# Patient Record
Sex: FEMALE | Race: BLACK OR AFRICAN AMERICAN | NOT HISPANIC OR LATINO | Employment: OTHER | URBAN - METROPOLITAN AREA
[De-identification: names, ages, dates, MRNs, and addresses within clinical notes are randomized per-mention and may not be internally consistent; named-entity substitution may affect disease eponyms.]

---

## 2024-02-01 RX ORDER — IXEKIZUMAB 80 MG/ML
INJECTION, SOLUTION SUBCUTANEOUS
Qty: 1 ML | OUTPATIENT
Start: 2024-02-01

## 2024-02-02 ENCOUNTER — TELEPHONE (OUTPATIENT)
Dept: DERMATOLOGY | Facility: CLINIC | Age: 64
End: 2024-02-02
Payer: COMMERCIAL

## 2024-02-02 NOTE — TELEPHONE ENCOUNTER
Patient left VM stating [facility] denied her refill request for Taltz. Can you please reach out to patient to provide her with more information about this?    Thanks!  Shae Rivera RN

## 2024-02-08 DIAGNOSIS — L40.9 PSORIASIS: Primary | ICD-10-CM

## 2024-02-13 ENCOUNTER — SPECIALTY PHARMACY (OUTPATIENT)
Dept: PHARMACY | Facility: CLINIC | Age: 64
End: 2024-02-13

## 2024-02-13 PROCEDURE — RXMED WILLOW AMBULATORY MEDICATION CHARGE

## 2024-02-13 RX ORDER — IXEKIZUMAB 80 MG/ML
INJECTION, SOLUTION SUBCUTANEOUS
Qty: 1 ML | Refills: 6 | Status: SHIPPED | OUTPATIENT
Start: 2024-02-13 | End: 2024-05-08 | Stop reason: ALTCHOICE

## 2024-02-14 ENCOUNTER — PHARMACY VISIT (OUTPATIENT)
Dept: PHARMACY | Facility: CLINIC | Age: 64
End: 2024-02-14
Payer: COMMERCIAL

## 2024-02-15 ENCOUNTER — SPECIALTY PHARMACY (OUTPATIENT)
Dept: PHARMACY | Facility: CLINIC | Age: 64
End: 2024-02-15

## 2024-03-07 ENCOUNTER — SPECIALTY PHARMACY (OUTPATIENT)
Dept: PHARMACY | Facility: CLINIC | Age: 64
End: 2024-03-07

## 2024-03-07 PROCEDURE — RXMED WILLOW AMBULATORY MEDICATION CHARGE

## 2024-03-08 ENCOUNTER — PHARMACY VISIT (OUTPATIENT)
Dept: PHARMACY | Facility: CLINIC | Age: 64
End: 2024-03-08
Payer: COMMERCIAL

## 2024-04-02 ENCOUNTER — SPECIALTY PHARMACY (OUTPATIENT)
Dept: PHARMACY | Facility: CLINIC | Age: 64
End: 2024-04-02

## 2024-04-02 PROCEDURE — RXMED WILLOW AMBULATORY MEDICATION CHARGE

## 2024-04-09 ENCOUNTER — PHARMACY VISIT (OUTPATIENT)
Dept: PHARMACY | Facility: CLINIC | Age: 64
End: 2024-04-09
Payer: COMMERCIAL

## 2024-04-18 ENCOUNTER — OFFICE VISIT (OUTPATIENT)
Dept: DERMATOLOGY | Facility: CLINIC | Age: 64
End: 2024-04-18
Payer: COMMERCIAL

## 2024-04-18 DIAGNOSIS — L40.9 PSORIASIS: ICD-10-CM

## 2024-04-18 DIAGNOSIS — L40.0 PSORIASIS VULGARIS: Primary | ICD-10-CM

## 2024-04-18 PROCEDURE — 99214 OFFICE O/P EST MOD 30 MIN: CPT | Performed by: NURSE PRACTITIONER

## 2024-04-18 RX ORDER — CLOBETASOL PROPIONATE 0.5 MG/G
CREAM TOPICAL 2 TIMES DAILY
Qty: 60 G | Refills: 3 | Status: SHIPPED | OUTPATIENT
Start: 2024-04-18 | End: 2024-06-25

## 2024-04-19 PROCEDURE — RXMED WILLOW AMBULATORY MEDICATION CHARGE

## 2024-04-20 RX ORDER — RISANKIZUMAB-RZAA 150 MG/ML
150 INJECTION SUBCUTANEOUS SEE ADMIN INSTRUCTIONS
Qty: 2 ML | Refills: 0 | Status: SHIPPED | OUTPATIENT
Start: 2024-04-20

## 2024-04-20 RX ORDER — RISANKIZUMAB-RZAA 150 MG/ML
150 INJECTION SUBCUTANEOUS SEE ADMIN INSTRUCTIONS
Qty: 1 ML | Refills: 3 | Status: SHIPPED | OUTPATIENT
Start: 2024-04-20

## 2024-04-20 NOTE — PROGRESS NOTES
Subjective     Emili Montez is a 63 y.o. female who presents for the following: Psoriasis (PSO follow-up. Possibly failing Taltz. Needs t-spot. Methotrexate. ).     Review of Systems:  No other skin or systemic complaints other than what is documented elsewhere in the note.    The following portions of the chart were reviewed this encounter and updated as appropriate:          Skin Cancer History  No skin cancer on file.      Specialty Problems    None       Objective   Well appearing patient in no apparent distress; mood and affect are within normal limits.    A focused skin examination was performed. All findings within normal limits unless otherwise noted below.    Assessment/Plan   1. Psoriasis vulgaris    Related Medications  clobetasol (Temovate) 0.05 % cream  Apply topically 2 times a day for 14 days.    2. Psoriasis  Well-demarcated erythematous papules and plaques with overlying silvery scale. Currently failing Taltz, PASI 60. Has medical history of PsA and PsO, CKD, DM2, and gastric sleeve in February 2023. Currently on 10mg methotrexate weekly.      - Psoriasis is a common, noncontagious condition that can present in a variety of ways in the skin. The subtypes of this condition include plaque, inverse (or skin fold), guttate, erythrodermic, and pustular psoriasis. Plaque psoriasis, which represents approximately 85% of psoriasis cases, is a lifelong skin condition that affects about 2%-3% of the population worldwide. Plaque psoriasis skin lesions are typically red and raised with overlying scale. There may be papules (small, raised bumps) or plaques (larger, raised skin lesions that are bigger than a thumbnail), or both. People with plaque psoriasis typically have thickened, white scaly patches on their skin.  - While the exact cause of psoriasis is unknown, this condition is the result of an overactive immune system that attacks the skin and other organs of the body. Psoriasis is very common in some  "families, suggesting a likely genetic component contributing to this disease, but it can also occur in individuals with no family history of psoriasis. Psoriasis can be triggered by certain environmental causes, such as emotional stress, pregnancy, injury to the skin, bacterial skin infections such as a streptococcal infection (\"strep\"), smoking or alcohol consumption, and ingesting certain medications.  - Because plaque psoriasis is a lifelong, chronic condition that currently has no cure, the goal of treatment is to decrease the number of skin lesions and reduce symptoms such as itching and pain. Most beneficial treatments for plaque psoriasis work in part due to their ability to alleviate the body's abnormal immune attack of the skin and help prevent the excessive buildup of skin cells or flakes.  - Bathe daily to help soften scales and moisten the skin. Avoid harsh soaps and scrubbing the skin as these may worsen psoriasis. Moisturizing soaps and soap substitutes, such as unscented Dove Sensitive Skin Beauty Bar, Vanicream Cleansing Bar, and CeraVe Psoriasis Cleanser, are milder products for the skin.  - The application of moisturizers after water exposure or bathing may be helpful. Heavier oil-based moisturizers help to retain water in the skin better than water-based moisturizers. Thicker moisturizers such as petroleum jelly (Vaseline), Aquaphor Healing Ointment, - Eucerin Original Healing Cream, Vanicream, Aveeno Moisturizing Cream, CeraVe Healing Ointment, or CeraVe Moisturizing Cream can be applied to damp skin daily after bathing. Use cream and ointments rather than lotions because lotions can dry out the skin.  - Apply over-the-counter hydrocortisone cream or ointment (0.5% or 1%) twice daily for 2-3 weeks at a time to help reduce itch and irritation. Stronger topical steroids are typically required for thicker psoriasis plaques. Long-term use of topical steroids should include periodic times of no " treatment each month to avoid thinning of the skin.  - Use of products with salicylic acid (shampoos, cleansers, and ointments), such as Neutrogena T/Sal, can help soften and remove thick psoriasis scale in the scalp.  - Small doses of natural sunlight may be helpful, such as 10-15 minutes approximately 2 or 3 times per week. Avoid too much sun; however, and protect your healthy skin from excessive sun exposure to help prevent premature aging of the skin and skin cancers.  - Follow a healthy diet to maintain an ideal weight. (Being overweight may make plaque psoriasis worse.)  Patient Support Resources  - The National Psoriasis Foundation (https://www.psoriasis.org/) is a useful resource for patients and health professionals that has additional information regarding psoriasis and the various available treatments. The National Psoriasis Foundation website includes access to psoriasis-related articles, psoriasis research, a directory of health care professionals with an expertise in psoriasis, and opportunities for patients to volunteer or get involved in upcoming events.  Plan  Patient failing Taltz, will start prior authorization for Skyrizi.   - Failing Taltz with both joint and skin involvement.   - Labs reviewed; Tspot 11/3/23 negative  - No history of CHF or IBD  - Discussed risks, benefits, and side effects of medications.     Related Medications  ixekizumab (Taltz Autoinjector) 80 mg/mL injection  INJECT 80MG (1 PEN) UNDER THE SKIN EVERY 4 WEEKS    risankizumab-rzaa (Skyrizi) 150 mg/mL pen injector pen  Inject 1 mL (150 mg) under the skin see administration instructions. Inject 1ml (150 mg) under the skin every 12 weeks.    risankizumab-rzaa (Skyrizi) 150 mg/mL pen injector pen  Inject 1 mL (150 mg) under the skin see administration instructions. Inject 1ml (150mg) under the skin at week 0 and week 4

## 2024-04-22 ENCOUNTER — SPECIALTY PHARMACY (OUTPATIENT)
Dept: PHARMACY | Facility: CLINIC | Age: 64
End: 2024-04-22

## 2024-04-23 ENCOUNTER — PHARMACY VISIT (OUTPATIENT)
Dept: PHARMACY | Facility: CLINIC | Age: 64
End: 2024-04-23
Payer: COMMERCIAL

## 2024-05-03 ENCOUNTER — SPECIALTY PHARMACY (OUTPATIENT)
Dept: PHARMACY | Facility: CLINIC | Age: 64
End: 2024-05-03

## 2024-05-03 PROCEDURE — RXMED WILLOW AMBULATORY MEDICATION CHARGE

## 2024-05-07 ENCOUNTER — PHARMACY VISIT (OUTPATIENT)
Dept: PHARMACY | Facility: CLINIC | Age: 64
End: 2024-05-07
Payer: COMMERCIAL

## 2024-05-08 ENCOUNTER — SPECIALTY PHARMACY (OUTPATIENT)
Dept: PHARMACY | Facility: CLINIC | Age: 64
End: 2024-05-08

## 2024-05-08 PROCEDURE — RXMED WILLOW AMBULATORY MEDICATION CHARGE

## 2024-05-09 ENCOUNTER — PHARMACY VISIT (OUTPATIENT)
Dept: PHARMACY | Facility: CLINIC | Age: 64
End: 2024-05-09
Payer: COMMERCIAL

## 2024-05-10 ENCOUNTER — SPECIALTY PHARMACY (OUTPATIENT)
Dept: PHARMACY | Facility: CLINIC | Age: 64
End: 2024-05-10

## 2024-05-13 NOTE — PROGRESS NOTES
[facility] Clinical Note    Emili Montez is a 63 y.o. female, who is on the specialty pharmacy service of: [facility].  Emili Montez is taking: Skyrizi.     Emili was contacted on 5/13/2024.    Refer to the encounter summary report for documentation details about patient counseling and education.      Impression/Plan  Is patient high risk? (potential patients:  pregnancy, geriatric, pediatric) no    Is laboratory follow-up needed? no  Is a clinical intervention needed?  no  Next assessment date?  6 months   Additional comments:    Medication Adherence  The importance of adherence was discussed with the patient and they were advised to take the medication as prescribed by their provider. Emili was encouraged to call her physician's office if they have a question regarding a missed dose.     QOL/Patient Satisfaction  Rate your quality of life on scale of 1-10: -- (unable to assess)  Rate your satisfaction with  Specialty Pharmacy on scale of 1-10: -- (unable to assess)      Patient advised to contact the pharmacy if there are any changes to her medication list, including prescriptions, OTC medications, herbal products, or supplements. Patient was advised of [facility]’s dispensing process, refill timeline, contact information ([phone]), and patient management follow up. Patient confirmed understanding of education conducted during assessment. All patient questions and concerns were addressed to the best of my ability. Patient was encouraged to contact the specialty pharmacy with any questions or concerns.    Confirmed follow-up outreaches are properly scheduled. Reviewed goals of therapy in the program targets.    David Vick, PharmD

## 2024-05-15 PROCEDURE — RXMED WILLOW AMBULATORY MEDICATION CHARGE

## 2024-05-17 ENCOUNTER — PHARMACY VISIT (OUTPATIENT)
Dept: PHARMACY | Facility: CLINIC | Age: 64
End: 2024-05-17
Payer: COMMERCIAL

## 2024-05-31 ENCOUNTER — SPECIALTY PHARMACY (OUTPATIENT)
Dept: PHARMACY | Facility: CLINIC | Age: 64
End: 2024-05-31

## 2024-05-31 PROCEDURE — RXMED WILLOW AMBULATORY MEDICATION CHARGE

## 2024-06-04 ENCOUNTER — PHARMACY VISIT (OUTPATIENT)
Dept: PHARMACY | Facility: CLINIC | Age: 64
End: 2024-06-04
Payer: COMMERCIAL

## 2024-06-11 ENCOUNTER — SPECIALTY PHARMACY (OUTPATIENT)
Dept: PHARMACY | Facility: CLINIC | Age: 64
End: 2024-06-11

## 2024-06-11 PROCEDURE — RXMED WILLOW AMBULATORY MEDICATION CHARGE

## 2024-06-14 ENCOUNTER — PHARMACY VISIT (OUTPATIENT)
Dept: PHARMACY | Facility: CLINIC | Age: 64
End: 2024-06-14
Payer: COMMERCIAL

## 2024-08-04 ENCOUNTER — SPECIALTY PHARMACY (OUTPATIENT)
Dept: PHARMACY | Facility: CLINIC | Age: 64
End: 2024-08-04

## 2024-08-04 PROCEDURE — RXMED WILLOW AMBULATORY MEDICATION CHARGE

## 2024-08-06 ENCOUNTER — PHARMACY VISIT (OUTPATIENT)
Dept: PHARMACY | Facility: CLINIC | Age: 64
End: 2024-08-06
Payer: COMMERCIAL

## 2024-08-12 ENCOUNTER — SPECIALTY PHARMACY (OUTPATIENT)
Dept: PHARMACY | Facility: CLINIC | Age: 64
End: 2024-08-12

## 2024-08-12 DIAGNOSIS — L40.0 PSORIASIS VULGARIS: ICD-10-CM

## 2024-08-12 RX ORDER — CLOBETASOL PROPIONATE 0.5 MG/G
CREAM TOPICAL 2 TIMES DAILY
Qty: 60 G | Refills: 3 | Status: SHIPPED | OUTPATIENT
Start: 2024-08-12 | End: 2024-08-29

## 2024-08-21 ENCOUNTER — APPOINTMENT (OUTPATIENT)
Dept: DERMATOLOGY | Facility: CLINIC | Age: 64
End: 2024-08-21
Payer: COMMERCIAL

## 2024-08-21 ENCOUNTER — SPECIALTY PHARMACY (OUTPATIENT)
Dept: PHARMACY | Facility: CLINIC | Age: 64
End: 2024-08-21

## 2024-08-21 DIAGNOSIS — L40.9 PSORIASIS: ICD-10-CM

## 2024-08-21 DIAGNOSIS — L40.0 PSORIASIS VULGARIS: ICD-10-CM

## 2024-08-21 PROCEDURE — 99214 OFFICE O/P EST MOD 30 MIN: CPT | Performed by: NURSE PRACTITIONER

## 2024-08-21 RX ORDER — CLOBETASOL PROPIONATE 0.5 MG/G
CREAM TOPICAL 2 TIMES DAILY
Qty: 60 G | Refills: 5 | Status: SHIPPED | OUTPATIENT
Start: 2024-08-21 | End: 2024-09-06

## 2024-08-21 NOTE — PROGRESS NOTES
Subjective   Emili Montez is a 64 y.o. female who presents for the following: Psoriasis.  Psoriasis  Symptoms have been ongoing for about a few years and have been unchanged. The patient reports symptoms of no symptoms, primarily affecting the arms, legs, scalp. Lesions appear to be exacerbated by no known precipitant. Treatments tried so far include  Taltz, Skyrizi , with adequate improvement. History of other significant skin problems: no. Family History of skin disease: no.        Objective   Well appearing patient in no apparent distress; mood and affect are within normal limits.    A focused examination was performed including upper extremities, including the arms, hands, fingers, and fingernails and lower extremities, including the legs, feet, toes, and toenails. All findings within normal limits unless otherwise noted below.    Doing well on Skyrizi, PASI 95. Has failed Taltz in the past. Has medical history of PsA and PsO, CKD, DM2, and gastric sleeve in February 2023.       Assessment/Plan   Psoriasis    - Psoriasis is a common, noncontagious condition that can present in a variety of ways in the skin. The subtypes of this condition include plaque, inverse (or skin fold), guttate, erythrodermic, and pustular psoriasis. Plaque psoriasis, which represents approximately 85% of psoriasis cases, is a lifelong skin condition that affects about 2%-3% of the population worldwide. Plaque psoriasis skin lesions are typically red and raised with overlying scale. There may be papules (small, raised bumps) or plaques (larger, raised skin lesions that are bigger than a thumbnail), or both. People with plaque psoriasis typically have thickened, white scaly patches on their skin.  - While the exact cause of psoriasis is unknown, this condition is the result of an overactive immune system that attacks the skin and other organs of the body. Psoriasis is very common in some families, suggesting a likely genetic component  "contributing to this disease, but it can also occur in individuals with no family history of psoriasis. Psoriasis can be triggered by certain environmental causes, such as emotional stress, pregnancy, injury to the skin, bacterial skin infections such as a streptococcal infection (\"strep\"), smoking or alcohol consumption, and ingesting certain medications.  - Because plaque psoriasis is a lifelong, chronic condition that currently has no cure, the goal of treatment is to decrease the number of skin lesions and reduce symptoms such as itching and pain. Most beneficial treatments for plaque psoriasis work in part due to their ability to alleviate the body's abnormal immune attack of the skin and help prevent the excessive buildup of skin cells or flakes.  - Bathe daily to help soften scales and moisten the skin. Avoid harsh soaps and scrubbing the skin as these may worsen psoriasis. Moisturizing soaps and soap substitutes, such as unscented Dove Sensitive Skin Beauty Bar, Vanicream Cleansing Bar, and CeraVe Psoriasis Cleanser, are milder products for the skin.  - The application of moisturizers after water exposure or bathing may be helpful. Heavier oil-based moisturizers help to retain water in the skin better than water-based moisturizers. Thicker moisturizers such as petroleum jelly (Vaseline), Aquaphor Healing Ointment, - Eucerin Original Healing Cream, Vanicream, Aveeno Moisturizing Cream, CeraVe Healing Ointment, or CeraVe Moisturizing Cream can be applied to damp skin daily after bathing. Use cream and ointments rather than lotions because lotions can dry out the skin.  - Apply over-the-counter hydrocortisone cream or ointment (0.5% or 1%) twice daily for 2-3 weeks at a time to help reduce itch and irritation. Stronger topical steroids are typically required for thicker psoriasis plaques. Long-term use of topical steroids should include periodic times of no treatment each month to avoid thinning of the skin.  - " Use of products with salicylic acid (shampoos, cleansers, and ointments), such as Neutrogena T/Sal, can help soften and remove thick psoriasis scale in the scalp.  - Small doses of natural sunlight may be helpful, such as 10-15 minutes approximately 2 or 3 times per week. Avoid too much sun; however, and protect your healthy skin from excessive sun exposure to help prevent premature aging of the skin and skin cancers.  - Follow a healthy diet to maintain an ideal weight. (Being overweight may make plaque psoriasis worse.)  Patient Support Resources  - The National Psoriasis Foundation (https://www.psoriasis.org/) is a useful resource for patients and health professionals that has additional information regarding psoriasis and the various available treatments. The National Psoriasis Foundation website includes access to psoriasis-related articles, psoriasis research, a directory of health care professionals with an expertise in psoriasis, and opportunities for patients to volunteer or get involved in upcoming events.  Plan  - Continue Skyrizi, inject as prescribed.    - Labs reviewed; Tspot 11/3/23 negative, Tspot ordered.   - No history of CHF or IBD  - Discussed risks, benefits, and side effects of medications.     Related Procedures  T-SPOT (Tb)    Related Medications  risankizumab-rzaa (Skyrizi) 150 mg/mL pen injector pen  Inject 1ml (150mg) under the skin every 12 weeks.    Psoriasis vulgaris    Related Medications  clobetasol (Temovate) 0.05 % cream  Apply topically 2 times a day for 14 days.    Follow up in 12 months. Please call me if there are any changes or development of concerning symptoms (lesion/skin condition is changing, bleeding, enlarging, or worsening).

## 2024-08-21 NOTE — LETTER
August 21, 2024     No Recipients    Patient: Emili Montez   YOB: 1960   Date of Visit: 8/21/2024       Dear Dr. Moreno Recipients:    Thank you for referring Emili Montez to me for evaluation. Below are my notes for this consultation.  If you have questions, please do not hesitate to call me. I look forward to following your patient along with you.       Sincerely,     Susan L Mayne, APRN-CNP      CC: No Recipients  ______________________________________________________________________________________    Subjective  Emili Montez is a 64 y.o. female who presents for the following: Psoriasis.  Psoriasis  Symptoms have been ongoing for about a few years and have been unchanged. The patient reports symptoms of no symptoms, primarily affecting the arms, legs, scalp. Lesions appear to be exacerbated by no known precipitant. Treatments tried so far include  Taltz, Skyrizi , with adequate improvement. History of other significant skin problems: no. Family History of skin disease: no.        Objective  Well appearing patient in no apparent distress; mood and affect are within normal limits.    A focused examination was performed including upper extremities, including the arms, hands, fingers, and fingernails and lower extremities, including the legs, feet, toes, and toenails. All findings within normal limits unless otherwise noted below.    Doing well on Skyrizi, PASI 95. Has failed Taltz in the past. Has medical history of PsA and PsO, CKD, DM2, and gastric sleeve in February 2023.       Assessment/Plan  Psoriasis    - Psoriasis is a common, noncontagious condition that can present in a variety of ways in the skin. The subtypes of this condition include plaque, inverse (or skin fold), guttate, erythrodermic, and pustular psoriasis. Plaque psoriasis, which represents approximately 85% of psoriasis cases, is a lifelong skin condition that affects about 2%-3% of the population worldwide. Plaque psoriasis skin lesions  "are typically red and raised with overlying scale. There may be papules (small, raised bumps) or plaques (larger, raised skin lesions that are bigger than a thumbnail), or both. People with plaque psoriasis typically have thickened, white scaly patches on their skin.  - While the exact cause of psoriasis is unknown, this condition is the result of an overactive immune system that attacks the skin and other organs of the body. Psoriasis is very common in some families, suggesting a likely genetic component contributing to this disease, but it can also occur in individuals with no family history of psoriasis. Psoriasis can be triggered by certain environmental causes, such as emotional stress, pregnancy, injury to the skin, bacterial skin infections such as a streptococcal infection (\"strep\"), smoking or alcohol consumption, and ingesting certain medications.  - Because plaque psoriasis is a lifelong, chronic condition that currently has no cure, the goal of treatment is to decrease the number of skin lesions and reduce symptoms such as itching and pain. Most beneficial treatments for plaque psoriasis work in part due to their ability to alleviate the body's abnormal immune attack of the skin and help prevent the excessive buildup of skin cells or flakes.  - Bathe daily to help soften scales and moisten the skin. Avoid harsh soaps and scrubbing the skin as these may worsen psoriasis. Moisturizing soaps and soap substitutes, such as unscented Dove Sensitive Skin Beauty Bar, Vanicream Cleansing Bar, and CeraVe Psoriasis Cleanser, are milder products for the skin.  - The application of moisturizers after water exposure or bathing may be helpful. Heavier oil-based moisturizers help to retain water in the skin better than water-based moisturizers. Thicker moisturizers such as petroleum jelly (Vaseline), Aquaphor Healing Ointment, - Eucerin Original Healing Cream, Vanicream, Aveeno Moisturizing Cream, CeraVe Healing " Ointment, or CeraVe Moisturizing Cream can be applied to damp skin daily after bathing. Use cream and ointments rather than lotions because lotions can dry out the skin.  - Apply over-the-counter hydrocortisone cream or ointment (0.5% or 1%) twice daily for 2-3 weeks at a time to help reduce itch and irritation. Stronger topical steroids are typically required for thicker psoriasis plaques. Long-term use of topical steroids should include periodic times of no treatment each month to avoid thinning of the skin.  - Use of products with salicylic acid (shampoos, cleansers, and ointments), such as Neutrogena T/Sal, can help soften and remove thick psoriasis scale in the scalp.  - Small doses of natural sunlight may be helpful, such as 10-15 minutes approximately 2 or 3 times per week. Avoid too much sun; however, and protect your healthy skin from excessive sun exposure to help prevent premature aging of the skin and skin cancers.  - Follow a healthy diet to maintain an ideal weight. (Being overweight may make plaque psoriasis worse.)  Patient Support Resources  - The National Psoriasis Foundation (https://www.psoriasis.org/) is a useful resource for patients and health professionals that has additional information regarding psoriasis and the various available treatments. The National Psoriasis Foundation website includes access to psoriasis-related articles, psoriasis research, a directory of health care professionals with an expertise in psoriasis, and opportunities for patients to volunteer or get involved in upcoming events.  Plan  - Continue Skyrizi, inject as prescribed.    - Labs reviewed; Tspot 11/3/23 negative, Tspot ordered.   - No history of CHF or IBD  - Discussed risks, benefits, and side effects of medications.     Related Procedures  T-SPOT (Tb)    Related Medications  risankizumab-rzaa (Skyrizi) 150 mg/mL pen injector pen  Inject 1ml (150mg) under the skin every 12 weeks.    Psoriasis vulgaris    Related  Medications  clobetasol (Temovate) 0.05 % cream  Apply topically 2 times a day for 14 days.    Follow up in 12 months. Please call me if there are any changes or development of concerning symptoms (lesion/skin condition is changing, bleeding, enlarging, or worsening).

## 2024-08-22 PROCEDURE — RXMED WILLOW AMBULATORY MEDICATION CHARGE

## 2024-08-23 ENCOUNTER — SPECIALTY PHARMACY (OUTPATIENT)
Dept: PHARMACY | Facility: CLINIC | Age: 64
End: 2024-08-23

## 2024-08-23 PROCEDURE — RXMED WILLOW AMBULATORY MEDICATION CHARGE

## 2024-08-27 ENCOUNTER — PHARMACY VISIT (OUTPATIENT)
Dept: PHARMACY | Facility: CLINIC | Age: 64
End: 2024-08-27
Payer: COMMERCIAL

## 2024-09-09 ENCOUNTER — SPECIALTY PHARMACY (OUTPATIENT)
Dept: PHARMACY | Facility: CLINIC | Age: 64
End: 2024-09-09

## 2024-09-19 ENCOUNTER — SPECIALTY PHARMACY (OUTPATIENT)
Dept: PHARMACY | Facility: CLINIC | Age: 64
End: 2024-09-19

## 2024-09-19 NOTE — PROGRESS NOTES
[facility] Clinical Note    Emili Montez is a 64 y.o. female, who is on the specialty pharmacy service for management of:  [facility].    Emili Montez is taking: Skyrizi.    Medication Receipt Date: last refilled 8/27/24  Medication Start Date (planned or actual): 05/2024    Emili was contacted on 9/19/2024 at 2:01 PM for a virtual pharmacy visit with encounter number [id number] from:   [facility]  [address] [place] [id number]  Dept: [phone]  Dept Fax: [phone]    Emili was offered a Telemedicine Video visit or Telephone visit.  Emili consented to a telephone visit, which was performed.    The most recent encounter visit with the referring prescriber Susan Mayne, CNP on 8/21/24 (Date) was reviewed.  Pharmacy will continue to collaborate in the care of this patient with the referring prescriber Susan Mayne, CNP.    General Assessment      Impression/Plan  IMPRESSION/PLAN:  Is patient high risk (potential patients:  pregnancy, geriatric, pediatric)? no   Is laboratory follow-up needed? no  Is a clinical intervention needed? no  Next reassessment date? 6 months   Additional comments: patient notes she is having cataract surgery in October. Recommended patient tell provider who is doing procedure that she is on Skyrizi. Typically a minor procedure and doesn't require changes in med schedule, but patient should make sure provider aware she is on Skyrizi.     Refer to the encounter summary report for documentation details about patient counseling and education.      Medication Adherence    The importance of adherence was discussed with the patient and they were advised to take the medication as prescribed by their provider. Patient was encouraged to call their physician's office if they have a question regarding a missed dose.     QOL/Patient Satisfaction  Rate your quality of life on scale of 1-10: 7  Rate your satisfaction with   Specialty Pharmacy on scale of 1-10: 10 - Completely satisfied      Patient was advised to contact the pharmacy if there are any changes to their medication list, including prescriptions, OTC medications, herbal products, or supplements. Patient was advised of [facility]'s dispensing process, refill timeline, contact information ([phone]), and patient management follow up. Patient confirmed understanding of education conducted during assessment. All patient questions and concerns were addressed to the best of my ability. Patient was encouraged to contact the specialty pharmacy with any questions or concerns.    Confirmed follow-up outreaches are properly scheduled and reviewed goals of therapy with the patient.        David Vick, PharmD

## 2024-10-31 ENCOUNTER — APPOINTMENT (OUTPATIENT)
Dept: DERMATOLOGY | Facility: CLINIC | Age: 64
End: 2024-10-31
Payer: COMMERCIAL

## 2024-11-12 ENCOUNTER — TELEPHONE (OUTPATIENT)
Dept: DERMATOLOGY | Facility: CLINIC | Age: 64
End: 2024-11-12
Payer: MEDICARE

## 2024-11-12 NOTE — TELEPHONE ENCOUNTER
Pt contacted office stating that she needs refills of Skyrizi sent to [facility]. Pt has outside T-spot to be reviewed by provider. Message sent to provider to review labs and send refills of Skyrizi if appropriate.     Shae Rivera RN

## 2024-11-13 DIAGNOSIS — L40.9 PSORIASIS: Primary | ICD-10-CM

## 2024-11-13 RX ORDER — RISANKIZUMAB-RZAA 150 MG/ML
150 INJECTION SUBCUTANEOUS SEE ADMIN INSTRUCTIONS
Qty: 1 ML | Refills: 3 | Status: SHIPPED | OUTPATIENT
Start: 2024-11-13 | End: 2024-11-13 | Stop reason: WASHOUT

## 2024-11-13 RX ORDER — RISANKIZUMAB-RZAA 150 MG/ML
150 INJECTION SUBCUTANEOUS SEE ADMIN INSTRUCTIONS
Qty: 1 ML | Refills: 3 | Status: SHIPPED | OUTPATIENT
Start: 2024-11-13

## 2024-11-18 ENCOUNTER — SPECIALTY PHARMACY (OUTPATIENT)
Dept: PHARMACY | Facility: CLINIC | Age: 64
End: 2024-11-18

## 2024-11-18 PROCEDURE — RXMED WILLOW AMBULATORY MEDICATION CHARGE

## 2024-11-21 ENCOUNTER — PHARMACY VISIT (OUTPATIENT)
Dept: PHARMACY | Facility: CLINIC | Age: 64
End: 2024-11-21
Payer: COMMERCIAL

## 2025-01-31 ENCOUNTER — SPECIALTY PHARMACY (OUTPATIENT)
Dept: PHARMACY | Facility: CLINIC | Age: 65
End: 2025-01-31

## 2025-01-31 PROCEDURE — RXMED WILLOW AMBULATORY MEDICATION CHARGE

## 2025-02-06 ENCOUNTER — PHARMACY VISIT (OUTPATIENT)
Dept: PHARMACY | Facility: CLINIC | Age: 65
End: 2025-02-06
Payer: COMMERCIAL

## 2025-03-19 ENCOUNTER — SPECIALTY PHARMACY (OUTPATIENT)
Dept: PHARMACY | Facility: CLINIC | Age: 65
End: 2025-03-19

## 2025-03-19 NOTE — PROGRESS NOTES
"[facility] Clinical Note  Patient Reassessment     Introduction  Emili Montez is a 64 y.o. female who is on the specialty pharmacy service for management of: Dermatology Core.      [facility] supplied medication: ankizumab-rzaa (Skyrizi) 150 mg/mL pen injector pen   Inject 150 mg (1 pen) under the skin every 12 weeks.        Duration of therapy: Maintenance    The most recent encounter visit with the referring prescriber  Susan L Mayne, APRN-CNP on 08/21/24 was reviewed.  Pharmacy will continue to collaborate in the care of this patient with the referring prescriber.    Discussion  Emili was contacted on 3/19/2025 at 9:32 AM for a pharmacy visit with encounter number [id number] from:   [facility]  [address] [place] [id number]  Dept: [phone]  Dept Fax: [phone]  Emili consented to a/an Telephone visit, which was performed.    Efficacy  Patient has developed new symptoms of condition: No  Patient/caregiver feels medication is affecting the disease state: Patient reports having great improvement with Skyrizi. She states her skin is clear after taking the dose but does start flaring at around week 9 or 10 where she starts developing some flaking. She reports using topical agents as needed for flares.    Goals  Provided education on goals and possible outcomes of therapy:  Adherence with therapy  Timely completion of appropriate labs  Timely and appropriate follow up with provider  Identify and address medication interactions with presciption medications, OTC medications and supplements  Optimize or maintain quality of life  Dermatology: Prevent or reduce disease flares  Reduce use of topical agents (corticosteroids or other \"prn\" agents)  Reduction of plaque size, thickness and body surface area (PSO)  Patient has documented target(s) for goals of therapy: Yes    Targets       Target Due Completed Completed By Outcome Source     Goal: Prevent and " "reduce disease flares 7/19/2025 3/19/2025 Gita Montano PharmD On Track --    Patient reports skin is 100% clear after taking a dose. She states starts flaring at week 9 or 10 when she develops a new patch and flaking.        Goal: Reduce use of ancillary or \"prn\" medications 7/19/2025 3/19/2025 Gita Montano PharmD On Track --    Patient reports using topical agents as needed for flares.              Tolerance  Patient has experienced side effects from this medication: No  Changes to current therapy regimen: No    The follow-up timeline was discussed. Every person responds to and reacts to therapy differently. Patient should be assessed for efficacy and tolerability in approximately: 6 months            Adherence  Patient Information  Informant: Self (Patient)  Demonstrates Understanding of Importance of Adherence: Yes  Does the patient have any barriers to self-administration (including physical and mental?): No  Medication Information  Medication: risankizumab-rzaa (Skyrizi)  Patient Reported Missed Doses in the Last 4 Weeks: 0  Estimated Medication Adherence Level: Good  Adherence Estimation Source: Claims history  Barriers to Adherence: No Problems identified   The importance of adherence was discussed and patient/caregiver was advised to take the medication as prescribed by their provider. Encouraged patient/caregiver to call physician's office or specialty pharmacy if they have a question regarding a missed dose.    General Assessment  Changes to home medications, OTCs or supplements: No  Current Outpatient Medications   Medication Sig Dispense Refill    risankizumab-rzaa (Skyrizi) 150 mg/mL pen injector pen Inject 150 mg (1 pen) under the skin every 12 weeks. 1 mL 3     No current facility-administered medications for this visit.     Reported new allergies: No  Reported new medical conditions: No  Additional monitoring reviewed: Dermatology- For Biologics- TB: No results found for: \"TBSIN\", \"TBGRES\", " "\"QFG\", \"TSPOTR\" and LFTs: No results found for: \"ALT\", \"AST\"  Is laboratory follow up needed? Yes - would recommend updated T-spot    Advised to contact the pharmacy if there are any changes to the patient's medication list, including prescriptions, OTC medications, herbal products, or supplements.    Impression/Plan  This patient has been identified as high risk due to Geriatric (over 65 years of age).  The following action was taken:Patient/caregiver encouraged to participate in patient management program          QOL/Patient Satisfaction  Rate your quality of life on scale of 1-10: 5  Rate your satisfaction with  Specialty Pharmacy on scale of 1-10: 10 - Completely satisfied    Provided contact information ([phone]) for [facility] and reviewed dispensing process, refill timeline and patient management follow up. Confirmed understanding of education conducted during assessment. All questions and concerns were addressed and patient/caregiver was encouraged to reach out for additional questions or concerns.    Based on the patient's diagnosis, medication list, progress towards goals, adherence, tolerance, and medication list, medication remains appropriate: Therapy remains appropriate (I attest)      Gita Montano, PharmD  "

## 2025-04-28 ENCOUNTER — SPECIALTY PHARMACY (OUTPATIENT)
Dept: PHARMACY | Facility: CLINIC | Age: 65
End: 2025-04-28

## 2025-04-28 PROCEDURE — RXMED WILLOW AMBULATORY MEDICATION CHARGE

## 2025-04-30 ENCOUNTER — PHARMACY VISIT (OUTPATIENT)
Dept: PHARMACY | Facility: CLINIC | Age: 65
End: 2025-04-30
Payer: COMMERCIAL

## 2025-07-23 PROCEDURE — RXMED WILLOW AMBULATORY MEDICATION CHARGE

## 2025-07-24 ENCOUNTER — SPECIALTY PHARMACY (OUTPATIENT)
Dept: PHARMACY | Facility: CLINIC | Age: 65
End: 2025-07-24

## 2025-07-29 ENCOUNTER — PHARMACY VISIT (OUTPATIENT)
Dept: PHARMACY | Facility: CLINIC | Age: 65
End: 2025-07-29
Payer: COMMERCIAL

## 2025-08-27 ENCOUNTER — APPOINTMENT (OUTPATIENT)
Dept: DERMATOLOGY | Facility: CLINIC | Age: 65
End: 2025-08-27
Payer: COMMERCIAL

## 2025-08-27 DIAGNOSIS — L40.9 PSORIASIS: ICD-10-CM

## 2025-08-27 DIAGNOSIS — Z79.899 OTHER LONG TERM (CURRENT) DRUG THERAPY: Primary | ICD-10-CM

## 2025-08-27 PROCEDURE — 99214 OFFICE O/P EST MOD 30 MIN: CPT | Performed by: NURSE PRACTITIONER

## 2025-08-27 RX ORDER — METHOTREXATE 2.5 MG/1
TABLET ORAL
COMMUNITY
Start: 2025-02-14

## 2025-08-27 RX ORDER — INSULIN LISPRO 100 [IU]/ML
INJECTION, SOLUTION INTRAVENOUS; SUBCUTANEOUS
COMMUNITY
Start: 2025-02-28

## 2025-08-27 RX ORDER — SPIRONOLACTONE 25 MG/1
25 TABLET ORAL
COMMUNITY
Start: 2023-09-11

## 2025-08-27 RX ORDER — LIFITEGRAST 50 MG/ML
1 SOLUTION/ DROPS OPHTHALMIC 2 TIMES DAILY
COMMUNITY
Start: 2024-04-08

## 2025-08-27 RX ORDER — CALCITRIOL 0.25 UG/1
0.25 CAPSULE ORAL DAILY
COMMUNITY

## 2025-08-27 RX ORDER — OXYCODONE AND ACETAMINOPHEN 5; 325 MG/1; MG/1
1 TABLET ORAL EVERY 6 HOURS PRN
COMMUNITY

## 2025-08-27 RX ORDER — MIRTAZAPINE 45 MG/1
TABLET, FILM COATED ORAL
COMMUNITY

## 2025-08-27 RX ORDER — ROSUVASTATIN CALCIUM 40 MG/1
40 TABLET, COATED ORAL NIGHTLY
COMMUNITY
Start: 2025-01-15

## 2025-08-27 RX ORDER — VENLAFAXINE HYDROCHLORIDE 150 MG/1
150 CAPSULE, EXTENDED RELEASE ORAL
COMMUNITY
Start: 2024-04-08

## 2025-08-27 RX ORDER — DEXLANSOPRAZOLE 60 MG/1
60 CAPSULE, DELAYED RELEASE ORAL
COMMUNITY
Start: 2025-08-14

## 2025-08-27 RX ORDER — LOSARTAN POTASSIUM 25 MG/1
25 TABLET ORAL
COMMUNITY
Start: 2025-02-14 | End: 2026-02-14

## 2025-08-27 RX ORDER — VIT C/E/ZN/COPPR/LUTEIN/ZEAXAN 250MG-90MG
500 CAPSULE ORAL
COMMUNITY

## 2025-08-27 RX ORDER — VENLAFAXINE HYDROCHLORIDE 75 MG/1
CAPSULE, EXTENDED RELEASE ORAL
COMMUNITY

## 2025-08-27 RX ORDER — TRAVOPROST 0.04 MG/ML
1 SOLUTION/ DROPS OPHTHALMIC
COMMUNITY
Start: 2024-01-08

## 2025-08-27 RX ORDER — TRAZODONE HYDROCHLORIDE 100 MG/1
1 TABLET ORAL NIGHTLY
COMMUNITY

## 2025-08-27 RX ORDER — LANCETS
EACH MISCELLANEOUS
COMMUNITY
Start: 2025-08-17

## 2025-08-27 RX ORDER — CLOPIDOGREL BISULFATE 75 MG/1
75 TABLET ORAL
COMMUNITY
Start: 2025-07-08

## 2025-08-27 RX ORDER — NITROGLYCERIN 0.4 MG/1
0.4 TABLET SUBLINGUAL EVERY 5 MIN PRN
COMMUNITY
Start: 2025-01-15

## 2025-08-27 RX ORDER — DICYCLOMINE HYDROCHLORIDE 20 MG/1
20 TABLET ORAL EVERY 8 HOURS PRN
COMMUNITY
Start: 2025-08-25

## 2025-08-27 RX ORDER — ASPIRIN 325 MG
TABLET, DELAYED RELEASE (ENTERIC COATED) ORAL
COMMUNITY
Start: 2025-01-09

## 2025-08-27 RX ORDER — METOPROLOL SUCCINATE 50 MG/1
50 TABLET, EXTENDED RELEASE ORAL
COMMUNITY
Start: 2025-06-13

## 2025-08-27 RX ORDER — ONDANSETRON 4 MG/1
4 TABLET, FILM COATED ORAL EVERY 8 HOURS PRN
COMMUNITY
Start: 2025-08-25

## 2025-08-27 RX ORDER — PREGABALIN 100 MG/1
100 CAPSULE ORAL 3 TIMES DAILY
COMMUNITY
Start: 2023-10-09

## 2025-08-27 RX ORDER — TIZANIDINE 4 MG/1
2 TABLET ORAL EVERY 6 HOURS PRN
COMMUNITY

## 2025-08-27 RX ORDER — GLUCAGON INJECTION, SOLUTION 1 MG/.2ML
INJECTION, SOLUTION SUBCUTANEOUS
COMMUNITY
Start: 2025-08-25

## 2026-08-27 ENCOUNTER — APPOINTMENT (OUTPATIENT)
Dept: DERMATOLOGY | Facility: CLINIC | Age: 66
End: 2026-08-27
Payer: COMMERCIAL